# Patient Record
Sex: FEMALE | URBAN - METROPOLITAN AREA
[De-identification: names, ages, dates, MRNs, and addresses within clinical notes are randomized per-mention and may not be internally consistent; named-entity substitution may affect disease eponyms.]

---

## 2021-09-14 ENCOUNTER — LAB ENCOUNTER (OUTPATIENT)
Dept: LAB | Age: 36
End: 2021-09-14
Attending: INTERNAL MEDICINE
Payer: COMMERCIAL

## 2021-09-14 ENCOUNTER — OFFICE VISIT (OUTPATIENT)
Dept: RHEUMATOLOGY | Facility: CLINIC | Age: 36
End: 2021-09-14
Payer: COMMERCIAL

## 2021-09-14 VITALS
HEIGHT: 64 IN | DIASTOLIC BLOOD PRESSURE: 90 MMHG | BODY MASS INDEX: 34.15 KG/M2 | HEART RATE: 97 BPM | WEIGHT: 200 LBS | OXYGEN SATURATION: 98 % | SYSTOLIC BLOOD PRESSURE: 123 MMHG

## 2021-09-14 DIAGNOSIS — R76.8 POSITIVE ANA (ANTINUCLEAR ANTIBODY): ICD-10-CM

## 2021-09-14 DIAGNOSIS — R76.8 POSITIVE ANA (ANTINUCLEAR ANTIBODY): Primary | ICD-10-CM

## 2021-09-14 DIAGNOSIS — M19.90 INFLAMMATORY ARTHRITIS: ICD-10-CM

## 2021-09-14 DIAGNOSIS — R76.8 ELEVATED RHEUMATOID FACTOR: Primary | ICD-10-CM

## 2021-09-14 DIAGNOSIS — L60.0 INGROWN TOENAIL OF RIGHT FOOT: ICD-10-CM

## 2021-09-14 DIAGNOSIS — Z51.81 THERAPEUTIC DRUG MONITORING: ICD-10-CM

## 2021-09-14 DIAGNOSIS — Z51.81 ENCOUNTER FOR THERAPEUTIC DRUG MONITORING: ICD-10-CM

## 2021-09-14 DIAGNOSIS — N39.0 FREQUENT UTI: ICD-10-CM

## 2021-09-14 DIAGNOSIS — Z11.59 NEED FOR HEPATITIS C SCREENING TEST: ICD-10-CM

## 2021-09-14 DIAGNOSIS — Z11.1 SCREENING FOR TUBERCULOSIS: ICD-10-CM

## 2021-09-14 DIAGNOSIS — R76.8 HEPATITIS B ANTIBODY POSITIVE: ICD-10-CM

## 2021-09-14 DIAGNOSIS — R76.8 ANA POSITIVE: ICD-10-CM

## 2021-09-14 DIAGNOSIS — Z11.1 SCREENING EXAMINATION FOR PULMONARY TUBERCULOSIS: ICD-10-CM

## 2021-09-14 DIAGNOSIS — Z11.59 NEED FOR HEPATITIS B SCREENING TEST: ICD-10-CM

## 2021-09-14 LAB
ALBUMIN SERPL-MCNC: 4 G/DL (ref 3.4–5)
ALBUMIN/GLOB SERPL: 0.9 {RATIO} (ref 1–2)
ALP LIVER SERPL-CCNC: 92 U/L
ALT SERPL-CCNC: 26 U/L
ANION GAP SERPL CALC-SCNC: 4 MMOL/L (ref 0–18)
AST SERPL-CCNC: 20 U/L (ref 15–37)
BASOPHILS # BLD AUTO: 0.06 X10(3) UL (ref 0–0.2)
BASOPHILS NFR BLD AUTO: 0.8 %
BILIRUB SERPL-MCNC: 0.5 MG/DL (ref 0.1–2)
BILIRUB UR QL STRIP.AUTO: NEGATIVE
BUN BLD-MCNC: 5 MG/DL (ref 7–18)
C3 SERPL-MCNC: 136 MG/DL (ref 90–180)
C4 SERPL-MCNC: 27.7 MG/DL (ref 10–40)
CALCIUM BLD-MCNC: 8.9 MG/DL (ref 8.5–10.1)
CHLORIDE SERPL-SCNC: 110 MMOL/L (ref 98–112)
CO2 SERPL-SCNC: 24 MMOL/L (ref 21–32)
CREAT BLD-MCNC: 0.57 MG/DL
CREAT UR-SCNC: 359 MG/DL
CRP SERPL-MCNC: <0.29 MG/DL (ref ?–0.3)
DEPRECATED HBV CORE AB SER IA-ACNC: 13 NG/ML
EOSINOPHIL # BLD AUTO: 0.15 X10(3) UL (ref 0–0.7)
EOSINOPHIL NFR BLD AUTO: 2 %
ERYTHROCYTE [DISTWIDTH] IN BLOOD BY AUTOMATED COUNT: 24.8 %
GLOBULIN PLAS-MCNC: 4.3 G/DL (ref 2.8–4.4)
GLUCOSE BLD-MCNC: 93 MG/DL (ref 70–99)
GLUCOSE UR STRIP.AUTO-MCNC: NEGATIVE MG/DL
HBV CORE AB SERPL QL IA: NONREACTIVE
HBV SURFACE AB SER QL: NONREACTIVE
HBV SURFACE AB SERPL IA-ACNC: <3.1 MIU/ML
HBV SURFACE AG SER-ACNC: <0.1 [IU]/L
HBV SURFACE AG SERPL QL IA: NONREACTIVE
HCT VFR BLD AUTO: 45.4 %
HCV AB SERPL QL IA: NONREACTIVE
HGB BLD-MCNC: 14 G/DL
IMM GRANULOCYTES # BLD AUTO: 0.03 X10(3) UL (ref 0–1)
IMM GRANULOCYTES NFR BLD: 0.4 %
KETONES UR STRIP.AUTO-MCNC: NEGATIVE MG/DL
LYMPHOCYTES # BLD AUTO: 1.84 X10(3) UL (ref 1–4)
LYMPHOCYTES NFR BLD AUTO: 25.1 %
MCH RBC QN AUTO: 24.8 PG (ref 26–34)
MCHC RBC AUTO-ENTMCNC: 30.8 G/DL (ref 31–37)
MCV RBC AUTO: 80.4 FL
MONOCYTES # BLD AUTO: 0.53 X10(3) UL (ref 0.1–1)
MONOCYTES NFR BLD AUTO: 7.2 %
NEUTROPHILS # BLD AUTO: 4.73 X10 (3) UL (ref 1.5–7.7)
NEUTROPHILS # BLD AUTO: 4.73 X10(3) UL (ref 1.5–7.7)
NEUTROPHILS NFR BLD AUTO: 64.5 %
NITRITE UR QL STRIP.AUTO: NEGATIVE
OSMOLALITY SERPL CALC.SUM OF ELEC: 283 MOSM/KG (ref 275–295)
PATIENT FASTING Y/N/NP: NO
PH UR STRIP.AUTO: 5 [PH] (ref 5–8)
PLATELET # BLD AUTO: 332 10(3)UL (ref 150–450)
PLATELET MORPHOLOGY: NORMAL
POTASSIUM SERPL-SCNC: 3.6 MMOL/L (ref 3.5–5.1)
PROT SERPL-MCNC: 8.3 G/DL (ref 6.4–8.2)
PROT UR STRIP.AUTO-MCNC: 30 MG/DL
PROT UR-MCNC: 27.4 MG/DL
RBC # BLD AUTO: 5.65 X10(6)UL
RHEUMATOID FACT SERPL-ACNC: 20 IU/ML (ref ?–15)
SED RATE-ML: 11 MM/HR
SODIUM SERPL-SCNC: 138 MMOL/L (ref 136–145)
SP GR UR STRIP.AUTO: 1.02 (ref 1–1.03)
THYROGLOB SERPL-MCNC: <15 U/ML (ref ?–60)
THYROPEROXIDASE AB SERPL-ACNC: <28 U/ML (ref ?–60)
TSI SER-ACNC: 1.8 MIU/ML (ref 0.36–3.74)
UROBILINOGEN UR STRIP.AUTO-MCNC: <2 MG/DL
VIT B12 SERPL-MCNC: 355 PG/ML (ref 193–986)
WBC # BLD AUTO: 7.3 X10(3) UL (ref 4–11)

## 2021-09-14 PROCEDURE — 85610 PROTHROMBIN TIME: CPT

## 2021-09-14 PROCEDURE — 3074F SYST BP LT 130 MM HG: CPT | Performed by: INTERNAL MEDICINE

## 2021-09-14 PROCEDURE — 86480 TB TEST CELL IMMUN MEASURE: CPT

## 2021-09-14 PROCEDURE — 3080F DIAST BP >= 90 MM HG: CPT | Performed by: INTERNAL MEDICINE

## 2021-09-14 PROCEDURE — 86038 ANTINUCLEAR ANTIBODIES: CPT

## 2021-09-14 PROCEDURE — 85705 THROMBOPLASTIN INHIBITION: CPT

## 2021-09-14 PROCEDURE — 99204 OFFICE O/P NEW MOD 45 MIN: CPT | Performed by: INTERNAL MEDICINE

## 2021-09-14 PROCEDURE — 81374 HLA I TYPING 1 ANTIGEN LR: CPT

## 2021-09-14 PROCEDURE — 87086 URINE CULTURE/COLONY COUNT: CPT | Performed by: INTERNAL MEDICINE

## 2021-09-14 PROCEDURE — 85613 RUSSELL VIPER VENOM DILUTED: CPT

## 2021-09-14 PROCEDURE — 85732 THROMBOPLASTIN TIME PARTIAL: CPT

## 2021-09-14 PROCEDURE — 86812 HLA TYPING A B OR C: CPT

## 2021-09-14 PROCEDURE — 3008F BODY MASS INDEX DOCD: CPT | Performed by: INTERNAL MEDICINE

## 2021-09-14 PROCEDURE — 82607 VITAMIN B-12: CPT

## 2021-09-14 RX ORDER — MELOXICAM 15 MG/1
15 TABLET ORAL DAILY
Qty: 30 TABLET | Refills: 0 | Status: SHIPPED | OUTPATIENT
Start: 2021-09-14 | End: 2021-10-12

## 2021-09-14 RX ORDER — LIDOCAINE HYDROCHLORIDE 20 MG/ML
1 SOLUTION ORAL; TOPICAL DAILY
COMMUNITY
Start: 2021-08-28

## 2021-09-14 NOTE — PROGRESS NOTES
Rheumatology New Patient Note  =====================================================================================================      Date of visit: 9/14/2021  ? Patient presents with:  Establish Care: New pt, referred by [facility].  Swelling i pleuritic chest pain, seizures/psychosis, Raynaud's, dry eyes/mouth, miscarriages or obstetric events (early pre-eclampsia, IUGR, placental insufficiency), or blood clots. Received Covid vaccine Brittany Fausto): April 6th, May 9th.      14 point ROS negative e synovitis in mcp, pip, dip, wrist, elbows, shoulders, hips, knees, ankles, mtp unless otherwise noted. Full ROM of elbows, wrists, knees. Tender to palpation in right lumbar paraspinals. No tenderness to palpation in the sacroiliac joints.   Positive malleolus.   No acute fractures      =====================================================================================================  Assessment and Plan    Assessment:  Positive ARUNA (antinuclear antibody)  (primary encounter diagnosis)  Ingrown toena well.      Diagnoses and all orders for this visit:    Positive ARUNA (antinuclear antibody)  -     ARUNA BY IFA, IGG; Future  -     LUPUS ANTICOAGULANT COMP; Future  -     BETA-2 GLYCOPROTEIN I AB,G/M  -     ANTICARDIOLIPIN AB, IGG, QN  -     ANTICARDIOLIPIN ANTIBODY    Screening for tuberculosis  -     QUANTIFERON TB GOLD (IN TUBE)    Inflammatory arthritis  -     Meloxicam 15 MG Oral Tab; Take 1 tablet (15 mg total) by mouth daily.  Take daily with food    Other orders  -     Cancel: GASTRO - INTERNAL

## 2021-09-15 LAB — ANA SCREEN: NEGATIVE

## 2021-09-15 NOTE — PATIENT INSTRUCTIONS
Take meloxicam 15 mg daily with food in the morning.   Do not take ibuprofen or naproxen on the same day; meloxicam is a stronger prescription strength version of these medications

## 2021-09-16 LAB
M TB IFN-G CD4+ T-CELLS BLD-ACNC: 0.02 IU/ML
M TB TUBERC IFN-G BLD QL: NEGATIVE
M TB TUBERC IGNF/MITOGEN IGNF CONTROL: >10 IU/ML
QFT TB1 AG MINUS NIL: 0 IU/ML
QFT TB2 AG MINUS NIL: 0.01 IU/ML

## 2021-09-17 ENCOUNTER — TELEPHONE (OUTPATIENT)
Dept: RHEUMATOLOGY | Facility: CLINIC | Age: 36
End: 2021-09-17

## 2021-09-17 LAB
ANTI-NUCLEAR ANTIBODY (ANA), HEP-2 IGG: DETECTED
APTT PPP: 29.4 SECONDS (ref 25.4–36.1)
CARDIOLIPIN IGG SERPL-ACNC: 2.5 GPL (ref ?–10)
CARDIOLIPIN IGM SERPL-ACNC: 3 MPL (ref ?–10)
CCP IGG SERPL-ACNC: 1.2 U/ML (ref 0–6.9)
DRVVT LUPUS ANTICOAGULANT: NEGATIVE
DRVVT SCREEN RATIO: 1 (ref 0–1.29)
PROTHROMBIN TIME: 13.3 SECONDS (ref 12.2–14.5)
STACLOT LA DELTA: 0.6 SECONDS (ref ?–8)
STACLOT LA: NEGATIVE

## 2021-09-17 NOTE — TELEPHONE ENCOUNTER
Can you call [facility] and see if the HLAB27 DNA testing be added on to the initial test? Phenotype is indeterminate. Otherwise will reorder this at next RTC      The HLAB27 phenotyping result by flow cytometry is indeterminate.    If clinically indicated, DNA t

## 2021-09-20 NOTE — TELEPHONE ENCOUNTER
Phoned Dima Campo at [address] [facility] for her to return my call regarding pt.

## 2021-09-23 NOTE — TELEPHONE ENCOUNTER
Phoned [facility], spoke with Shagufta Rios in regards to send out. Looking to add HLA B27 DNA.    Will call [facility] and phone office with update

## 2021-09-29 LAB — ANKYLOSING SPONDYLITIS (HLAB27): NEGATIVE

## 2021-10-12 ENCOUNTER — TELEPHONE (OUTPATIENT)
Dept: RHEUMATOLOGY | Facility: CLINIC | Age: 36
End: 2021-10-12

## 2021-10-12 ENCOUNTER — OFFICE VISIT (OUTPATIENT)
Dept: RHEUMATOLOGY | Facility: CLINIC | Age: 36
End: 2021-10-12
Payer: COMMERCIAL

## 2021-10-12 VITALS
HEIGHT: 64 IN | WEIGHT: 200 LBS | DIASTOLIC BLOOD PRESSURE: 80 MMHG | SYSTOLIC BLOOD PRESSURE: 115 MMHG | BODY MASS INDEX: 34.15 KG/M2 | OXYGEN SATURATION: 98 % | HEART RATE: 71 BPM

## 2021-10-12 DIAGNOSIS — M19.90 INFLAMMATORY ARTHRITIS: Primary | ICD-10-CM

## 2021-10-12 DIAGNOSIS — R76.8 RHEUMATOID FACTOR POSITIVE: ICD-10-CM

## 2021-10-12 DIAGNOSIS — Z79.899 ENCOUNTER FOR LONG-TERM CURRENT USE OF HIGH RISK MEDICATION: ICD-10-CM

## 2021-10-12 DIAGNOSIS — R76.8 POSITIVE ANA (ANTINUCLEAR ANTIBODY): ICD-10-CM

## 2021-10-12 DIAGNOSIS — L60.0 INGROWN TOENAIL OF RIGHT FOOT: Primary | ICD-10-CM

## 2021-10-12 PROCEDURE — 3079F DIAST BP 80-89 MM HG: CPT | Performed by: INTERNAL MEDICINE

## 2021-10-12 PROCEDURE — 3008F BODY MASS INDEX DOCD: CPT | Performed by: INTERNAL MEDICINE

## 2021-10-12 PROCEDURE — 3074F SYST BP LT 130 MM HG: CPT | Performed by: INTERNAL MEDICINE

## 2021-10-12 PROCEDURE — 99214 OFFICE O/P EST MOD 30 MIN: CPT | Performed by: INTERNAL MEDICINE

## 2021-10-12 RX ORDER — PREDNISONE 1 MG/1
TABLET ORAL
Qty: 129 TABLET | Refills: 0 | Status: SHIPPED | OUTPATIENT
Start: 2021-10-12 | End: 2022-01-10

## 2021-10-12 RX ORDER — HYDROXYCHLOROQUINE SULFATE 200 MG/1
TABLET, FILM COATED ORAL
Qty: 180 TABLET | Refills: 1 | Status: SHIPPED | OUTPATIENT
Start: 2021-10-12 | End: 2021-12-14

## 2021-10-12 NOTE — PROGRESS NOTES
Rheumatology f/u Patient Note  =====================================================================================================    Today's Visit: 10/12/21    ? Patient presents with:   Follow - Up: 3-4wk f/u, had labs completed in Sep. Here to rev ==============================================================================================================  Today's Visit: 10/12/21    Taking Mobic 15 mg for the last month.   Pain is 30 to 40% better, however still has quite a bit of pain in the to CN2-12 grossly intact   Psych: normal affect. Skin: No lesions or rashes. MSK: 28 joint count performed. No evidence of synovitis in mcp, pip, dip, wrist, elbows, shoulders, hips, knees, ankles, mtp unless otherwise noted.  Full ROM of elbows, wrists, kn DRVVT Negative 09/14/2021     Lab Results   Component Value Date    CARDIOLIPIGG 2.5 09/14/2021    CARDIOLIPIGM 3.0 09/14/2021         Additional Labs:    9/2021 labs  Ferritin 13, vitamin B12 355  C3/C4 normal  CRP wnl   Sed rate 11  Anti-TG, anti-TPO neg total) daily for 14 days, THEN 1.5 tablets (7.5 mg total) daily for 21 days, THEN 1 tablet (5 mg total) daily. ,   -Hydroxychloroquine (plaquenil) start: Take 1 tab daily with dinner for 2 weeks, and if tolerating, then increase to 1 tab twice daily.  -get with the patient. Questions related to this recommended plan of care were answered. Thank you for referring this delightful patient to me. Please feel free to contact me with any questions.      This report was performed utilizing speech recognition soft

## 2021-10-12 NOTE — PATIENT INSTRUCTIONS
Message for call our office in 1 week to let us know how you are doing    Stop meloxicam. Start prednisone 5 mg tab.  Take 3 tablets (15 mg total) by mouth daily for 7 days, THEN 2 tablets (10 mg total) daily for 14 days, THEN 1.5 tablets (7.5 mg total) d

## 2021-10-13 NOTE — TELEPHONE ENCOUNTER
Please let the patient know that I put in a referral to podiatry for ingrown toenail.   Can see either Dr. Yi Rodriguez or Dr. Citlali Guthrie

## 2021-10-13 NOTE — TELEPHONE ENCOUNTER
----- Message from Jarocho Arias RN sent at 10/12/2021  4:51 PM CDT -----  Regarding: FW: podiatry (foot doctor)    ----- Message -----  From: Javid Riojas  Sent: 10/12/2021   4:50 PM CDT  To: [facility]  Subject: podiatry (kristina

## 2021-10-13 NOTE — TELEPHONE ENCOUNTER
Called pt to update per notes below from Dr Kelsey Harp; lm on vm to cb re: referral.  Main dept # given.

## 2021-12-09 ENCOUNTER — TELEPHONE (OUTPATIENT)
Dept: RHEUMATOLOGY | Facility: CLINIC | Age: 36
End: 2021-12-09

## 2021-12-14 ENCOUNTER — OFFICE VISIT (OUTPATIENT)
Dept: RHEUMATOLOGY | Facility: CLINIC | Age: 36
End: 2021-12-14
Payer: COMMERCIAL

## 2021-12-14 VITALS
WEIGHT: 200 LBS | HEIGHT: 64 IN | BODY MASS INDEX: 34.15 KG/M2 | OXYGEN SATURATION: 99 % | DIASTOLIC BLOOD PRESSURE: 80 MMHG | SYSTOLIC BLOOD PRESSURE: 115 MMHG | HEART RATE: 102 BPM

## 2021-12-14 DIAGNOSIS — M19.90 INFLAMMATORY ARTHRITIS: Primary | ICD-10-CM

## 2021-12-14 DIAGNOSIS — Z79.899 LONG-TERM USE OF HYDROXYCHLOROQUINE: ICD-10-CM

## 2021-12-14 DIAGNOSIS — Z51.81 THERAPEUTIC DRUG MONITORING: ICD-10-CM

## 2021-12-14 DIAGNOSIS — R79.0 LOW FERRITIN: ICD-10-CM

## 2021-12-14 DIAGNOSIS — R76.8 RHEUMATOID FACTOR POSITIVE: ICD-10-CM

## 2021-12-14 PROCEDURE — 3074F SYST BP LT 130 MM HG: CPT | Performed by: INTERNAL MEDICINE

## 2021-12-14 PROCEDURE — 3008F BODY MASS INDEX DOCD: CPT | Performed by: INTERNAL MEDICINE

## 2021-12-14 PROCEDURE — 3079F DIAST BP 80-89 MM HG: CPT | Performed by: INTERNAL MEDICINE

## 2021-12-14 PROCEDURE — 99214 OFFICE O/P EST MOD 30 MIN: CPT | Performed by: INTERNAL MEDICINE

## 2021-12-14 RX ORDER — FERROUS SULFATE 325(65) MG
325 TABLET ORAL
Qty: 90 TABLET | Refills: 1 | Status: SHIPPED | OUTPATIENT
Start: 2021-12-14

## 2021-12-14 RX ORDER — PREDNISONE 1 MG/1
TABLET ORAL
Qty: 450 TABLET | Refills: 0 | Status: SHIPPED | OUTPATIENT
Start: 2021-12-14 | End: 2022-05-13

## 2021-12-14 RX ORDER — HYDROXYCHLOROQUINE SULFATE 200 MG/1
200 TABLET, FILM COATED ORAL 2 TIMES DAILY
Qty: 180 TABLET | Refills: 1 | Status: SHIPPED | OUTPATIENT
Start: 2021-12-14

## 2021-12-14 NOTE — PROGRESS NOTES
Rheumatology f/u Patient Note  =====================================================================================================      Patient presents with: Follow - Up: 2mon f/u, was doing okay until she ran out of her medication.  Medication has ==============================================================================================================  Visit: 10/12/21    Taking Mobic 15 mg for the last month. Pain is 30 to 40% better, however still has quite a bit of pain in the toes.   Rig 21 days, THEN 1 tablet (5 mg total) daily. , Disp: 129 tablet, Rfl: 0      Modified Medications    Modified Medication Previous Medication    HYDROXYCHLOROQUINE 200 MG ORAL TAB Hydroxychloroquine Sulfate 200 MG Oral Tab       Take 1 tablet (200 mg total) by Value Date    WBC 7.3 09/14/2021    RBC 5.65 (H) 09/14/2021    HGB 14.0 09/14/2021    HCT 45.4 09/14/2021    .0 09/14/2021    MCV 80.4 09/14/2021    MCH 24.8 (L) 09/14/2021    MCHC 30.8 (L) 09/14/2021    RDW 24.8 09/14/2021    NEPRELIM 4.73 09/14/20 normal  Chlamydia and gonorrhea REE negative  ARUNA by IFA 1: 160 speckled  dsDNA, RNP, Smith, SCL 70, SSA, SSB, chromatin, Emily 1, centromere all negative  A1c 5.3  TSH 1.87    Radiology:    Radiology review:  7/2021  X-ray of left ankle:  There is soft tissue 2 months    Diagnoses and all orders for this visit:    Inflammatory arthritis  -     OPHTHALMOLOGY - EXTERNAL  -     hydroxychloroquine 200 MG Oral Tab; Take 1 tablet (200 mg total) by mouth 2 (two) times daily.  Hydroxychloroquine (plaquenil) start: Take drug monitoring        Return in about 4 months (around 4/14/2022). The above plan of care, diagnosis, orders, and follow-up were discussed with the patient. Questions related to this recommended plan of care were answered.     Thank you for referring th

## 2021-12-14 NOTE — PATIENT INSTRUCTIONS
-Continue hydroxychloroquine 200 mg twice a day  -Continue 5 mg of prednisone for the next month then decrease by 1 mg every 30 days. You can try decreasing by 1 mg as quickly as every 15 days if you are feeling very good  -Continue iron daily.   -Get fl

## 2022-04-12 ENCOUNTER — OFFICE VISIT (OUTPATIENT)
Dept: RHEUMATOLOGY | Facility: CLINIC | Age: 37
End: 2022-04-12
Payer: COMMERCIAL

## 2022-04-12 VITALS
OXYGEN SATURATION: 99 % | SYSTOLIC BLOOD PRESSURE: 115 MMHG | DIASTOLIC BLOOD PRESSURE: 62 MMHG | HEIGHT: 64 IN | HEART RATE: 61 BPM | WEIGHT: 204 LBS | BODY MASS INDEX: 34.83 KG/M2

## 2022-04-12 DIAGNOSIS — Z51.81 THERAPEUTIC DRUG MONITORING: ICD-10-CM

## 2022-04-12 DIAGNOSIS — R76.8 RHEUMATOID FACTOR POSITIVE: ICD-10-CM

## 2022-04-12 DIAGNOSIS — Z79.899 LONG-TERM USE OF HYDROXYCHLOROQUINE: ICD-10-CM

## 2022-04-12 DIAGNOSIS — S86.899A ANTERIOR SHIN SPLINTS: ICD-10-CM

## 2022-04-12 DIAGNOSIS — R79.9 ABNORMAL BLOOD CHEMISTRY: ICD-10-CM

## 2022-04-12 DIAGNOSIS — E53.8 LOW VITAMIN B12 LEVEL: ICD-10-CM

## 2022-04-12 DIAGNOSIS — M19.90 INFLAMMATORY ARTHRITIS: Primary | ICD-10-CM

## 2022-04-12 DIAGNOSIS — R79.0 LOW FERRITIN: ICD-10-CM

## 2022-04-12 PROCEDURE — 3078F DIAST BP <80 MM HG: CPT | Performed by: INTERNAL MEDICINE

## 2022-04-12 PROCEDURE — 3074F SYST BP LT 130 MM HG: CPT | Performed by: INTERNAL MEDICINE

## 2022-04-12 PROCEDURE — 99214 OFFICE O/P EST MOD 30 MIN: CPT | Performed by: INTERNAL MEDICINE

## 2022-04-12 PROCEDURE — 3008F BODY MASS INDEX DOCD: CPT | Performed by: INTERNAL MEDICINE

## 2022-04-12 RX ORDER — HYDROXYCHLOROQUINE SULFATE 200 MG/1
200 TABLET, FILM COATED ORAL 2 TIMES DAILY
Qty: 180 TABLET | Refills: 1 | Status: SHIPPED | OUTPATIENT
Start: 2022-04-12

## 2022-04-12 RX ORDER — PREDNISONE 1 MG/1
1 TABLET ORAL
Qty: 90 TABLET | Refills: 1 | Status: SHIPPED | OUTPATIENT
Start: 2022-04-12 | End: 2022-07-11

## 2022-04-12 NOTE — PROGRESS NOTES
Rheumatology f/u Patient Note  =====================================================================================================    Chief Complaint:   Inflammatory arthritis. Patient presents with: Follow - Up: 4mon f/u, feeling better then last appt but still having issues with swelling and pain. ?  =====================================================================================================  HPI  Red Kehr is a 39year old female   Patient here for +ARUNA testing. Seen at [facility] in July and was found to have a positive antinuclear antibody 1: 160 speckled. Notes swelling in LEs for 2 months on lateral aspect of ankle and dorsum of foot. Not painful, but it is harder to walk. Morning is good. Gets worse throughout the day. Elevates her legs which helps. Sleeping makes the swelling. Given prednisone 40 mg daily x 1 week which helped resolve swelling. X-ray of left ankle was negative besides lateral ankle swelling. . Compression stockings help.   +Chronic abd pain and diarrhea that comes and goes. No BRBPR. Is lactose intolerant. Never seen gastroenterologist.  +fatigue x many years. Hx low ferritin. Has frequent UTIs. Recently seen by urgent care on August 23, 2021 and was treated for a UTI. Beta hemolytic strep Group B was positive on UCx. Received antibiotics for this issue and for UTI: augmentin, clindamycin, macrobid. Hx of hives after abx  +dry eye (very dry). Fingers swell. No kids. No miscarriages. Handles workman's comp, works in a desk job. Denies current alopecia, malar rash, photosensitivity rash, discoid lesions, oral/nasal ulcers, pleuritic chest pain, seizures/psychosis, Raynaud's, dry eyes/mouth, miscarriages or obstetric events (early pre-eclampsia, IUGR, placental insufficiency), or blood clots. Received Covid vaccine Stacey Kidd): April 6th, May 9th. ==============================================================================================================  Visit: 10/12/21  Taking Mobic 15 mg for the last month. Pain is 30 to 40% better, however still has quite a bit of pain in the toes. Right wrist bothersome, typing more often recently. Taking iron supplements for low ferritin    ==============================================================================================================  Visit: 12/14/21  Was doing well until recently medication ran out x 3 days last week (because insurance issue). Vision a bit off while driving in the dark. Hard to see lines. abd pain/diarrhea: still with upset stomach and variable diarrhea but better. Meds:  Prednisone 5 mg daily  hydroxychloroquine (plaquenil) 200 mg BID    ==============================================================================================================  Today's Visit: 04/12/22    Doing better, could not taper of prednisone completely. Has a dog, but the dog hurts her legs. Anterior shins are painful. Previous ankle and MTP pain now resolved. Medications:  hydroxychloroquine (plaquenil) 200 mg BID   1 mg prednisone    5 point ROS negative except noted above  I had reviewed past medical and family histories together with allergy and medication lists documented. Medications:  predniSONE 1 MG Oral Tab, Take 1 tablet (1 mg total) by mouth daily with breakfast., Disp: 90 tablet, Rfl: 1  hydroxychloroquine 200 MG Oral Tab, Take 1 tablet (200 mg total) by mouth 2 (two) times daily. Hydroxychloroquine (plaquenil): 1 tab twice daily. , Disp: 180 tablet, Rfl: 1  Ergocalciferol (VITAMIN D OR), Take by mouth., Disp: , Rfl:   Probiotic Product (PROBIOTIC DAILY OR), Take by mouth., Disp: , Rfl:   CALCIUM OR, Take by mouth., Disp: , Rfl:   Ferrous Sulfate 325 (65 Fe) MG Oral Tab, Take 1 tablet (325 mg total) by mouth daily with dinner., Disp: 90 tablet, Rfl: 1      Modified Medications Modified Medication Previous Medication    HYDROXYCHLOROQUINE 200 MG ORAL TAB hydroxychloroquine 200 MG Oral Tab       Take 1 tablet (200 mg total) by mouth 2 (two) times daily. Hydroxychloroquine (plaquenil): 1 tab twice daily. Take 1 tablet (200 mg total) by mouth 2 (two) times daily. Hydroxychloroquine (plaquenil) start: Take 1 tab daily with dinner for 2 weeks, and if tolerating, then increase to 1 tab twice daily. PREDNISONE 1 MG ORAL TAB predniSONE 1 MG Oral Tab       Take 1 tablet (1 mg total) by mouth daily with breakfast.    Take 5 tablets (5 mg total) by mouth daily with breakfast for 30 days, THEN 4 tablets (4 mg total) daily with breakfast for 30 days, THEN 3 tablets (3 mg total) daily with breakfast for 30 days, THEN 2 tablets (2 mg total) daily with breakfast for 30 days, THEN 1 tablet (1 mg total) daily with breakfast.     Medications Discontinued During This Encounter  FEROSUL 325 (65 Fe) MG Oral Tab        hydroxychloroquine 200 MG Oral Tab     predniSONE 1 MG Oral Tab                   Allergies:  Not on File      Objective     04/12/22  0952   BP: 115/62   Pulse: 61   SpO2: 99%   Weight: 204 lb (92.5 kg)   Height: 5' 4\" (1.626 m)       GEN: NAD, well-nourished. HEENT: Head: NCAT. Face: No lesions. Eyes: Conjunctiva clear. Sclera are anicteric. PERRLA. EOMs are full. PULM: easy effort  Extremities: No cyanosis, edema or deformities. Neurologic: Strength, CN2-12 grossly intact   Psych: normal affect. MSK: 28 joint count performed. No evidence of synovitis in mcp, pip, dip, wrist, elbows, shoulders, hips, knees, ankles, mtp unless otherwise noted. Full ROM of elbows, wrists, knees. Tender to palpation in the right lateral knee. Tender to palpation in bilateral anterior shins and calves. ?  Labs:     Additional Labs:    9/2021 labs  Ferritin 13, vitamin B12 355  C3/C4 normal  CRP wnl   Sed rate 11  Anti-TG, anti-TPO negative, TSH normal  RF 20   lupus anticoagulant, anticardiolipin IgG/IgM, beta-2 glycoprotein IgG/IgM neg  ARUNA 1: 80 speckled  Hep B/C/Quant gold negative  HLA-B27 negative    7/2021 labs  WBC 8.5, hemoglobin 9.0, MCV 67, platelets 538  Creatinine 0.57, rest of CMP normal  Chlamydia and gonorrhea REE negative  ARUNA by IFA 1: 160 speckled  dsDNA, RNP, Smith, SCL 70, SSA, SSB, chromatin, Emily 1, centromere all negative  A1c 5.3  TSH 1.87    Radiology:    Radiology review:  7/2021  X-ray of left ankle: There is soft tissue swelling along the lateral malleolus. No acute fractures      =====================================================================================================  Assessment and Plan    Assessment:  Inflammatory arthritis  (primary encounter diagnosis)  Anterior shin splints  Low ferritin  Therapeutic drug monitoring  Low vitamin B12 level  Abnormal blood chemistry  Rheumatoid factor positive  Long-term use of hydroxychloroquine    Patient here for follow-up of chronic inflammatory arthritis. There is a weakly positive ARUNA and low titer rheumatoid factor. Historically there has been evidence suggesting inflammatory arthritis/enthesitis of the bilateral MTPs, Achilles, peroneal tendons. This is now better with hydroxychloroquine and low-dose prednisone. Patient's current anterior shin pain and calf pain is more mechanical in nature rather than inflammatory. Phenotype favors either a seropositive (+ RF) rheumatoid arthritis versus seronegative spondylarthritis vs undifferentiated connective tissue disease (UCTD) given dry eye. Prior medications: meloxicam (partial efficacy)    High risk medication labs including CMP and CBC w/ diff reviewed from 9/2021. Results are stable. Plan:  -Repeat monitoring labs including CMP, CBC with differential.  Repeat ferritin and vitamin B12 given low/borderline values in the past.  Also obtain vitamin D given prior abnormal chemistry new monitor for leg cramping.   Prednisone 1 mg daily for 2 months then decrease to 1 mg every other day for 2 months. Continue hydroxychloroquine 200 mg twice daily. See ophthalmology: for hydroxychloroquine (plaquenil) monitoring and episodic night time decrease in vision   -Continue iron daily for low ferritin  -refer ophtho for hydroxychloroquine (plaquenil) monitoring and episodic night time decrease in vision   -rtc 2 months    Diagnoses and all orders for this visit:    Inflammatory arthritis  -     predniSONE 1 MG Oral Tab; Take 1 tablet (1 mg total) by mouth daily with breakfast.  -     hydroxychloroquine 200 MG Oral Tab; Take 1 tablet (200 mg total) by mouth 2 (two) times daily. Hydroxychloroquine (plaquenil): 1 tab twice daily. Anterior shin splints  -     COMP METABOLIC PANEL (14)  -     CBC WITH DIFFERENTIAL WITH PLATELET  -     FERRITIN  -     VITAMIN B-12; Future  -     VITAMIN D; Future    Low ferritin  -     COMP METABOLIC PANEL (14)  -     CBC WITH DIFFERENTIAL WITH PLATELET  -     FERRITIN  -     VITAMIN B-12; Future  -     VITAMIN D; Future  -     hydroxychloroquine 200 MG Oral Tab; Take 1 tablet (200 mg total) by mouth 2 (two) times daily. Hydroxychloroquine (plaquenil): 1 tab twice daily. Therapeutic drug monitoring  -     COMP METABOLIC PANEL (14)  -     CBC WITH DIFFERENTIAL WITH PLATELET  -     FERRITIN  -     VITAMIN B-12; Future  -     VITAMIN D; Future    Low vitamin B12 level  -     COMP METABOLIC PANEL (14)  -     CBC WITH DIFFERENTIAL WITH PLATELET  -     FERRITIN  -     VITAMIN B-12; Future    Abnormal blood chemistry  -     COMP METABOLIC PANEL (14)  -     CBC WITH DIFFERENTIAL WITH PLATELET  -     FERRITIN  -     VITAMIN B-12; Future  -     VITAMIN D; Future    Rheumatoid factor positive  -     predniSONE 1 MG Oral Tab; Take 1 tablet (1 mg total) by mouth daily with breakfast.  -     hydroxychloroquine 200 MG Oral Tab; Take 1 tablet (200 mg total) by mouth 2 (two) times daily. Hydroxychloroquine (plaquenil): 1 tab twice daily.     Long-term use of hydroxychloroquine  -     OPHTHALMOLOGY - EXTERNAL        No follow-ups on file. The above plan of care, diagnosis, orders, and follow-up were discussed with the patient. Questions related to this recommended plan of care were answered. Thank you for referring this delightful patient to me. Please feel free to contact me with any questions. This report was performed utilizing speech recognition software technology. Despite proofreading, speech recognition errors could escape detection. If a word or phrase is confusing or out of context, please do not hesitate to call for   clarification.        Kind regards      Leonidas Bingham MD  [facility]

## 2022-04-12 NOTE — PATIENT INSTRUCTIONS
Prednisone 1 mg daily for 2 months then decrease to 1 mg every other day for 2 months. Continue hydroxychloroquine 200 mg twice daily.   See ophthalmology: for hydroxychloroquine (plaquenil) monitoring and episodic night time decrease in vision

## 2022-10-24 ENCOUNTER — OFFICE VISIT (OUTPATIENT)
Dept: RHEUMATOLOGY | Facility: CLINIC | Age: 37
End: 2022-10-24
Payer: COMMERCIAL

## 2022-10-24 VITALS
SYSTOLIC BLOOD PRESSURE: 125 MMHG | BODY MASS INDEX: 35.34 KG/M2 | HEIGHT: 64 IN | OXYGEN SATURATION: 98 % | TEMPERATURE: 98 F | HEART RATE: 68 BPM | WEIGHT: 207 LBS | DIASTOLIC BLOOD PRESSURE: 70 MMHG | RESPIRATION RATE: 16 BRPM

## 2022-10-24 DIAGNOSIS — M19.90 INFLAMMATORY ARTHRITIS: Primary | ICD-10-CM

## 2022-10-24 DIAGNOSIS — Z51.81 THERAPEUTIC DRUG MONITORING: ICD-10-CM

## 2022-10-24 DIAGNOSIS — R60.0 LOWER EXTREMITY EDEMA: ICD-10-CM

## 2022-10-24 DIAGNOSIS — Z79.899 LONG-TERM USE OF HYDROXYCHLOROQUINE: ICD-10-CM

## 2022-10-24 DIAGNOSIS — R79.0 LOW FERRITIN: ICD-10-CM

## 2022-10-24 DIAGNOSIS — R76.8 RHEUMATOID FACTOR POSITIVE: ICD-10-CM

## 2022-10-24 PROCEDURE — 99214 OFFICE O/P EST MOD 30 MIN: CPT | Performed by: INTERNAL MEDICINE

## 2022-10-24 PROCEDURE — 3078F DIAST BP <80 MM HG: CPT | Performed by: INTERNAL MEDICINE

## 2022-10-24 PROCEDURE — 3074F SYST BP LT 130 MM HG: CPT | Performed by: INTERNAL MEDICINE

## 2022-10-24 PROCEDURE — 3008F BODY MASS INDEX DOCD: CPT | Performed by: INTERNAL MEDICINE

## 2022-10-24 RX ORDER — ACETAMINOPHEN 325 MG/1
325 TABLET ORAL EVERY 6 HOURS PRN
COMMUNITY

## 2022-10-24 RX ORDER — HYDROXYCHLOROQUINE SULFATE 200 MG/1
200 TABLET, FILM COATED ORAL 2 TIMES DAILY
Qty: 180 TABLET | Refills: 1 | Status: SHIPPED | OUTPATIENT
Start: 2022-10-24

## 2022-10-24 RX ORDER — HYDROCHLOROTHIAZIDE 25 MG/1
25 TABLET ORAL DAILY
Qty: 30 TABLET | Refills: 0 | Status: SHIPPED | OUTPATIENT
Start: 2022-10-24

## 2022-10-24 RX ORDER — PREDNISONE 1 MG/1
5 TABLET ORAL DAILY
COMMUNITY
Start: 2022-07-26

## 2022-10-24 RX ORDER — HYDROCHLOROTHIAZIDE 25 MG/1
25 TABLET ORAL DAILY
Qty: 30 TABLET | Refills: 0 | Status: SHIPPED | OUTPATIENT
Start: 2022-10-24 | End: 2022-10-24

## 2022-10-24 RX ORDER — IBUPROFEN 600 MG/1
600 TABLET ORAL EVERY 6 HOURS PRN
COMMUNITY

## 2023-01-24 ENCOUNTER — OFFICE VISIT (OUTPATIENT)
Dept: RHEUMATOLOGY | Facility: CLINIC | Age: 38
End: 2023-01-24
Payer: COMMERCIAL

## 2023-01-24 VITALS
BODY MASS INDEX: 36.02 KG/M2 | HEIGHT: 64 IN | OXYGEN SATURATION: 98 % | RESPIRATION RATE: 16 BRPM | WEIGHT: 211 LBS | DIASTOLIC BLOOD PRESSURE: 64 MMHG | HEART RATE: 85 BPM | SYSTOLIC BLOOD PRESSURE: 106 MMHG | TEMPERATURE: 98 F

## 2023-01-24 DIAGNOSIS — E53.8 LOW VITAMIN B12 LEVEL: ICD-10-CM

## 2023-01-24 DIAGNOSIS — R76.8 RHEUMATOID FACTOR POSITIVE: ICD-10-CM

## 2023-01-24 DIAGNOSIS — R60.0 LOWER EXTREMITY EDEMA: ICD-10-CM

## 2023-01-24 DIAGNOSIS — M25.50 ARTHRALGIA, UNSPECIFIED JOINT: ICD-10-CM

## 2023-01-24 DIAGNOSIS — Z51.81 THERAPEUTIC DRUG MONITORING: ICD-10-CM

## 2023-01-24 DIAGNOSIS — R79.0 LOW FERRITIN: Primary | ICD-10-CM

## 2023-01-24 DIAGNOSIS — M19.90 INFLAMMATORY ARTHRITIS: ICD-10-CM

## 2023-01-24 PROCEDURE — 3008F BODY MASS INDEX DOCD: CPT | Performed by: INTERNAL MEDICINE

## 2023-01-24 PROCEDURE — 99214 OFFICE O/P EST MOD 30 MIN: CPT | Performed by: INTERNAL MEDICINE

## 2023-01-24 PROCEDURE — 3078F DIAST BP <80 MM HG: CPT | Performed by: INTERNAL MEDICINE

## 2023-01-24 PROCEDURE — 3074F SYST BP LT 130 MM HG: CPT | Performed by: INTERNAL MEDICINE

## 2023-01-24 RX ORDER — HYDROCHLOROTHIAZIDE 12.5 MG/1
12.5 TABLET ORAL DAILY PRN
Qty: 30 TABLET | Refills: 0 | Status: SHIPPED | OUTPATIENT
Start: 2023-01-24

## 2023-01-24 NOTE — PATIENT INSTRUCTIONS
-get blood work now.     -try water pill (hydrochlorothiazide) for 2 weeks. Then stop and see if it makes a difference.     -Take a look at pill bottle of prednisone. If it is 1 mg tablet you can stop the medication cold turkey NOW.  If it is 5 mg tablet, carole message me as we will need to have a different plan to taper/get you off the prednisone.  -if you have a flare of arthritis, restart 1 mg of prednisone daily and let me know.   -continue hydroxychloroquine (plaquenil) 200 mg twice daily  Get ophthalmology (not optometry) examination ASAP

## 2023-10-05 ENCOUNTER — LAB REQUISITION (OUTPATIENT)
Dept: LAB | Facility: HOSPITAL | Age: 38
End: 2023-10-05
Payer: COMMERCIAL

## 2023-10-05 DIAGNOSIS — K81.9 CHOLECYSTITIS, UNSPECIFIED: ICD-10-CM

## 2023-10-05 PROCEDURE — 88304 TISSUE EXAM BY PATHOLOGIST: CPT | Performed by: SURGERY

## (undated) NOTE — LETTER
9/14/2021    Dear Cherelle Pimentel,    Thank you for referring your patient to me for an evaluation. Please see my attached note for my findings and recommendations.  Should you have any questions or concerns, please do not hesitate to contact me at number [phone]. Hx low ferritin. Has frequent UTIs. Recently seen by urgent care on August 23, 2021 and was treated for a UTI. Beta hemolytic strep Group B was positive on UCx. Received antibiotics for this issue and for UTI: augmentin, clindamyc deformities. Nasal septum is midline. Mouth: The lips are within normal limits. No oral ulcers Tongue is midline with no lesions. The oral cavity is clear. Neck: Supple. No neck masses. No thyromegaly. No LAD, parotid or submandicular gland palpated. [id number], [id number]  No results found for: CARDIOLIPIGG, [address], [address], [address], [place]      Additional Labs:  7/2021 labs  WBC 8.5, hemoglobin 9.0, MCV 67, platelets 137  Creatinine 0.57, rest of CMP normal  Chlamydia and gonorrhea REE n high risk med use use and monitoring for toxicity  , eval chronic viral inflammatory arthrits: Hepatitis B: sAB, cAB IgM AND Total, HBsAg, Hepatitis C AB,  IGRA  -mobic 15 mg daily with food x 30 days.   -Consider GI referral at next appointment given inte IGG  -     RHEUMATOID ARTHRITIS FACTOR  -     PROTEIN,TOTAL,URINE, RANDOM  -     CREATININE, URINE, RANDOM  -     ARUNA, DIRECT, REFLEX TO 9 ENAS  -     HLA B 27 DISE[employer] ASSOCIATION  -     QUANTIFERON TB GOLD (IN TUBE)  -     VITAMIN B-12; Future  -     FERR